# Patient Record
Sex: MALE | Race: WHITE | HISPANIC OR LATINO | URBAN - METROPOLITAN AREA
[De-identification: names, ages, dates, MRNs, and addresses within clinical notes are randomized per-mention and may not be internally consistent; named-entity substitution may affect disease eponyms.]

---

## 2020-11-06 PROCEDURE — U0003 INFECTIOUS AGENT DETECTION BY NUCLEIC ACID (DNA OR RNA); SEVERE ACUTE RESPIRATORY SYNDROME CORONAVIRUS 2 (SARS-COV-2) (CORONAVIRUS DISEASE [COVID-19]), AMPLIFIED PROBE TECHNIQUE, MAKING USE OF HIGH THROUGHPUT TECHNOLOGIES AS DESCRIBED BY CMS-2020-01-R: HCPCS | Performed by: NURSE PRACTITIONER

## 2020-11-09 ENCOUNTER — TELEPHONE (OUTPATIENT)
Dept: URGENT CARE | Facility: CLINIC | Age: 30
End: 2020-11-09

## 2020-11-09 NOTE — TELEPHONE ENCOUNTER
----- Message from JORGITO Ortega sent at 11/9/2020  8:22 AM EST -----  Patient positive for Covid 19.  Please notify patient health department.  Please educate on CDC requirements for COVID-19.    Lab results reviewed with [facility]. Patient notified of positive Covid-19 test result. VU. States he is feeling better. Instructed to quarantine for 2 weeks per CDC recommendation, patient VU. Advised to monitor for worsening symptoms, and seek treatment in ER is he experiences respiratory distress, bluish color of lips or face, new onset confusion, inability to stay awake, or persistent pain in chest. Patient VU. Advised the health dept. would be notified of positive result and would follow-up with him, patient CLARA.